# Patient Record
Sex: FEMALE | Race: WHITE | NOT HISPANIC OR LATINO | Employment: FULL TIME | URBAN - METROPOLITAN AREA
[De-identification: names, ages, dates, MRNs, and addresses within clinical notes are randomized per-mention and may not be internally consistent; named-entity substitution may affect disease eponyms.]

---

## 2023-02-11 ENCOUNTER — HOSPITAL ENCOUNTER (EMERGENCY)
Facility: HOSPITAL | Age: 28
Discharge: HOME | End: 2023-02-11
Attending: EMERGENCY MEDICINE | Admitting: EMERGENCY MEDICINE
Payer: MEDICAID

## 2023-02-11 VITALS
SYSTOLIC BLOOD PRESSURE: 140 MMHG | HEIGHT: 65 IN | BODY MASS INDEX: 42.87 KG/M2 | HEART RATE: 80 BPM | RESPIRATION RATE: 18 BRPM | TEMPERATURE: 98 F | WEIGHT: 257.28 LBS | DIASTOLIC BLOOD PRESSURE: 92 MMHG | OXYGEN SATURATION: 100 %

## 2023-02-11 DIAGNOSIS — R68.84 JAW PAIN: Primary | ICD-10-CM

## 2023-02-11 PROCEDURE — 99282 EMERGENCY DEPT VISIT SF MDM: CPT

## 2023-02-12 NOTE — ED PROVIDER NOTES
"Subjective   History of Present Illness  28-year-old female presents with complaint of right jaw pain and \"stiffness\".  She reports approximately 2 weeks ago she did have some dental pain in the lower molar that has since resolved.  She denies associated headache fever sweats chills.  Reports pain is referred to the right ear.  She denies history of grinding or clenching her teeth.  She does have all her wisdom teeth present.  She reports taking Motrin with relief.  She does not have a dentist established.    No primary care    Last menstrual period 1/21/2023 no contraceptive use            Review of Systems    History reviewed. No pertinent past medical history.    Allergies   Allergen Reactions   • Penicillins Unknown - High Severity     Been told since she was a baby.       History reviewed. No pertinent surgical history.    History reviewed. No pertinent family history.    Social History     Socioeconomic History   • Marital status: Single           Objective   Physical Exam  Vitals and nursing note reviewed.   Constitutional:       General: She is awake. She is not in acute distress.     Appearance: Normal appearance. She is well-developed. She is obese. She is not ill-appearing or toxic-appearing.   HENT:      Head: Normocephalic and atraumatic. No right periorbital erythema or left periorbital erythema.      Jaw: There is normal jaw occlusion. Tenderness and pain on movement present. No trismus, swelling or malocclusion.      Salivary Glands: Right salivary gland is not diffusely enlarged or tender. Left salivary gland is not diffusely enlarged or tender.        Right Ear: Hearing, tympanic membrane, ear canal and external ear normal. No middle ear effusion. Tympanic membrane is not injected, scarred, perforated, erythematous, retracted or bulging.      Left Ear: Hearing, tympanic membrane, ear canal and external ear normal.  No middle ear effusion. Tympanic membrane is not injected, scarred, perforated, " erythematous, retracted or bulging.      Nose: Nose normal. No mucosal edema or rhinorrhea.      Right Sinus: No maxillary sinus tenderness or frontal sinus tenderness.      Left Sinus: No maxillary sinus tenderness or frontal sinus tenderness.      Mouth/Throat:      Lips: Pink. No lesions.      Dentition: Normal dentition. No dental caries or dental abscesses.      Pharynx: Oropharynx is clear. Uvula midline. No oropharyngeal exudate or uvula swelling.      Tonsils: 1+ on the right. 1+ on the left.      Comments: No dental caries noted.  No gingival swelling.  No dental abscess noted.  No gum lesions.  Eyes:      Conjunctiva/sclera: Conjunctivae normal.      Pupils: Pupils are equal, round, and reactive to light.   Cardiovascular:      Rate and Rhythm: Normal rate and regular rhythm.      Heart sounds: Normal heart sounds, S1 normal and S2 normal. Heart sounds not distant. No murmur heard.    No friction rub. No gallop.   Pulmonary:      Effort: Pulmonary effort is normal. No respiratory distress.      Breath sounds: Normal breath sounds. No stridor. No wheezing or rales.   Musculoskeletal:      Cervical back: Normal range of motion and neck supple.   Lymphadenopathy:      Cervical: No cervical adenopathy.   Skin:     General: Skin is warm and dry.      Capillary Refill: Capillary refill takes less than 2 seconds.      Coloration: Skin is not pale.      Findings: No rash.   Neurological:      Mental Status: She is alert and oriented to person, place, and time.      GCS: GCS eye subscore is 4. GCS verbal subscore is 5. GCS motor subscore is 6.      Cranial Nerves: No cranial nerve deficit.      Sensory: No sensory deficit.      Motor: No abnormal muscle tone.   Psychiatric:         Mood and Affect: Mood normal.         Behavior: Behavior normal. Behavior is cooperative.         Thought Content: Thought content normal.         Judgment: Judgment normal.         Procedures           ED Course                           "                 Medical Decision Making     Interpreted by radiologist as below:     No radiology results for the last day      /92 (BP Location: Right arm, Patient Position: Lying)   Pulse 80   Temp 98 °F (36.7 °C) (Oral)   Resp 18   Ht 165.1 cm (65\")   Wt 117 kg (257 lb 4.4 oz)   LMP 01/21/2023   SpO2 100%   BMI 42.81 kg/m²      Lab Results (last 24 hours)     ** No results found for the last 24 hours. **           Medications - No data to display     Patient undressed and placed in gown for exam.  Appropriate PPE worn during patient exam.  Appropriate monitoring initiated. Patient is alert and oriented x3.  No acute distress noted.  S1-S2 heart sounds on exam.  Lungs are clear to auscultation. No dental caries noted.  No gingival swelling.  No dental abscess noted.  No gum lesions.  TMs are not erythematous, no effusion noted.  I reviewed chart, nothing available for comparison. Differential Diagnoses, not all-inclusive and does not constitute entirety of all causes: TMJ disorder, bruxism, otitis media.  Patient has point tenderness noted over the right TMJ, and pain with palpation of musculature.  Unable to definitively rule out bruxism, but there is no obvious wearing of the teeth.  [place] media ruled out by exam.  Patient was given a short course of diclofenac.  She was encouraged to rotate heat and ice every 2 hours while awake.  She was given referral to dentist.    Disposition/Treatment: Discussed results with patient, verbalized understanding.  Discussed reasons to return to the ER, patient verbalized understanding.  Agreeable with plan of care.  Patient was stable upon discharge.    Upon reassessment, patient remains pink warm and dry no acute distress.    Part of this note may be an electronic transcription/translation of spoken language to printed text using the Dragon Dictation System.         Final diagnoses:   Jaw pain       ED Disposition  ED Disposition     ED Disposition   Discharge "    Condition   Stable    Comment   --             [facility] - [facility] [address] [phone]  Schedule an appointment as soon as possible for a visit       [facility]  [address] [place], [place] [id number]  [phone]  Schedule an appointment as soon as possible for a visit       [facility] EMERGENCY DEPARTMENT  [address] [facility] [address] [phone]  Go to   If symptoms worsen         Medication List      New Prescriptions    diclofenac 50 MG EC tablet  Commonly known as: VOLTAREN  Take 1 tablet by mouth 3 (Three) Times a Day for 5 days.           Where to Get Your Medications      These medications were sent to [facility] [id number] - [facility] [place] - [address] [facility] - [phone]  - [phone] FX  [address] [facility] [place] [address]    Phone: [phone]   · diclofenac 50 MG EC tablet          Desire Barry, APRN  02/12/23 0410

## 2023-02-12 NOTE — DISCHARGE INSTRUCTIONS
Diclofenac as prescribed.  As discussed, do not take any additional NSAIDs with this medication.  Okay to take Tylenol.  Rotate heat and ice every 2 hours while awake, on for 20 minutes.  Sure you are drinking at least 130 ounces of water daily.  It is important for you to establish primary care provider for primary care needs.  Call patient connection to establish care and schedule follow-up.  Call [facility] for further management.  Return to the ER for new or worsening symptoms.